# Patient Record
Sex: MALE | Race: WHITE | HISPANIC OR LATINO | Employment: UNEMPLOYED | URBAN - METROPOLITAN AREA
[De-identification: names, ages, dates, MRNs, and addresses within clinical notes are randomized per-mention and may not be internally consistent; named-entity substitution may affect disease eponyms.]

---

## 2020-06-15 ENCOUNTER — HOSPITAL ENCOUNTER (EMERGENCY)
Facility: MEDICAL CENTER | Age: 35
End: 2020-06-15
Attending: EMERGENCY MEDICINE
Payer: COMMERCIAL

## 2020-06-15 ENCOUNTER — APPOINTMENT (OUTPATIENT)
Dept: RADIOLOGY | Facility: MEDICAL CENTER | Age: 35
End: 2020-06-15
Attending: EMERGENCY MEDICINE
Payer: COMMERCIAL

## 2020-06-15 VITALS
DIASTOLIC BLOOD PRESSURE: 70 MMHG | WEIGHT: 155 LBS | HEIGHT: 72 IN | SYSTOLIC BLOOD PRESSURE: 105 MMHG | BODY MASS INDEX: 20.99 KG/M2 | RESPIRATION RATE: 19 BRPM | OXYGEN SATURATION: 96 % | TEMPERATURE: 98.2 F | HEART RATE: 84 BPM

## 2020-06-15 DIAGNOSIS — Z59.00 HOMELESSNESS: ICD-10-CM

## 2020-06-15 DIAGNOSIS — R07.89 ATYPICAL CHEST PAIN: ICD-10-CM

## 2020-06-15 DIAGNOSIS — F41.0 ANXIETY ATTACK: ICD-10-CM

## 2020-06-15 LAB — EKG IMPRESSION: NORMAL

## 2020-06-15 PROCEDURE — 700111 HCHG RX REV CODE 636 W/ 250 OVERRIDE (IP): Performed by: EMERGENCY MEDICINE

## 2020-06-15 PROCEDURE — 93005 ELECTROCARDIOGRAM TRACING: CPT | Performed by: EMERGENCY MEDICINE

## 2020-06-15 PROCEDURE — 99284 EMERGENCY DEPT VISIT MOD MDM: CPT

## 2020-06-15 PROCEDURE — 96372 THER/PROPH/DIAG INJ SC/IM: CPT

## 2020-06-15 PROCEDURE — 71045 X-RAY EXAM CHEST 1 VIEW: CPT

## 2020-06-15 RX ORDER — OLANZAPINE 5 MG/1
10 TABLET ORAL NIGHTLY
COMMUNITY

## 2020-06-15 RX ORDER — LORAZEPAM 2 MG/ML
2 INJECTION INTRAMUSCULAR ONCE
Status: COMPLETED | OUTPATIENT
Start: 2020-06-15 | End: 2020-06-15

## 2020-06-15 RX ADMIN — LORAZEPAM 2 MG: 2 INJECTION INTRAMUSCULAR; INTRAVENOUS at 08:20

## 2020-06-15 SDOH — HEALTH STABILITY: MENTAL HEALTH: HOW OFTEN DO YOU HAVE A DRINK CONTAINING ALCOHOL?: 2-3 TIMES A WEEK

## 2020-06-15 SDOH — ECONOMIC STABILITY - HOUSING INSECURITY: HOMELESSNESS UNSPECIFIED: Z59.00

## 2020-06-15 NOTE — ED NOTES
Pt uncooperative with EKG at this time. Pt educated on the purpose of exam. Pt still uncooperative.

## 2020-06-15 NOTE — ED NOTES
"RN attempted to d/c pt. Pt states \"I am homeless and don't know where to go\".  called and information packet of [facility] resources obtained for pt.   "

## 2020-06-15 NOTE — ED PROVIDER NOTES
ED Provider Note    Scribed for Andres Pierce M.D. by Kimberly Baird. 6/15/2020  8:12 AM    Primary care provider: None noted.  Means of arrival: EMS  History obtained from: Patient   History limited by: None     CHIEF COMPLAINT  Chief Complaint   Patient presents with   • Chest Pain       HPI  Raymond Joe is a 34 y.o. male, with a history of schizophrenia, who presents to the Emergency Department moderate to severe central chest pain onset prior to arrival. He denies any radiation of pain and notes he fell to the ground secondary to pain. No alleviating or exacerbating factors noted at this time. Per triage note, patient was found laying on a side walk complaining of chest pain. He was reportedly kicked out of the [facility] where he was living. No additional pain or symptoms noted at this time.     REVIEW OF SYSTEMS  Pertinent positives include chest pain.   Pertinent negatives include: No additional pain or symptoms noted at this time. .    All other systems reviewed and negative. See HPI for further details.     PAST MEDICAL HISTORY   has a past medical history of Schizophrenia (HCC).    SURGICAL HISTORY  patient denies any surgical history    SOCIAL HISTORY  Social History     Tobacco Use   • Smoking status: Never Smoker   • Smokeless tobacco: Never Used   Substance Use Topics   • Alcohol use: Yes     Frequency: 2-3 times a week   • Drug use: Not Currently     Types: Inhaled     Comment: meth      Social History     Substance and Sexual Activity   Drug Use Not Currently   • Types: Inhaled    Comment: meth       FAMILY HISTORY  History reviewed. No pertinent family history.    CURRENT MEDICATIONS  Home Medications     Reviewed by Danya Varma R.N. (Registered Nurse) on 06/15/20 at 0752  Med List Status: Not Addressed   Medication Last Dose Status   OLANZapine (ZYPREXA) 5 MG Tab  Active                ALLERGIES  No Known Allergies    PHYSICAL EXAM  VITAL SIGNS: /84   Pulse (!) 104   Temp 37.1 °C  (98.7 °F) (Temporal)   Resp (!) 28   Ht 1.829 m (6')   Wt 70.3 kg (155 lb)   SpO2 99%   BMI 21.02 kg/m²     Nursing note and vitals reviewed.  Constitutional: Well-developed and well-nourished. No distress. Disheveled and tremulous.   HENT: Head is normocephalic and atraumatic. Oropharynx is clear and moist without exudate or erythema.   Eyes: Pupils are equal, round, and reactive to light. Conjunctiva are normal.   Cardiovascular: Normal rate and regular rhythm. No murmur heard. Normal radial pulses.  Pulmonary/Chest: Breath sounds normal. No wheezes or rales. Hyperventilating.   Abdominal: Soft and non-tender. No distention    Musculoskeletal: Extremities exhibit normal range of motion without edema or tenderness.   Neurological: Awake, alert and oriented to person, place, and time. No focal deficits noted.  Skin: Skin is warm and dry. No rash.   Psychiatric: Normal mood and affect. Appropriate for clinical situation. Anxious.     I verified that the patient was wearing a mask and I was wearing appropriate PPE every time I entered the room. The patient's mask was on the patient at all times during my encounter except for a brief view of the oropharynx.     DIAGNOSTIC STUDIES / PROCEDURES    EKG Interpretation  Interpreted by me as below    LABS  Results for orders placed or performed during the hospital encounter of 06/15/20   EKG (NOW)   Result Value Ref Range    Report       [facility] Emergency Dept.    Test Date:  2020-06-15  Pt Name:    CORIN HORVATH                Department: ER  MRN:        [id number]                      Room:        30  Gender:     Male                         Technician: [id number]  :        1985                   Requested By:ER TRIAGE PROTOCOL  Order #:    [id number]                    Reading MD: SETH BARRON MD    Measurements  Intervals                                Axis  Rate:       106                          P:          77  MD:         129                           QRS:        83  QRSD:       94                           T:          69  QT:         332  QTc:        441    Interpretive Statements  SINUS TACHYCARDIA  PROBABLE LEFT ATRIAL ABNORMALITY  RSR' IN V1 OR V2, PROBABLY NORMAL VARIANT  LATERAL INFARCT, OLD  ARTIFACT IN LEAD(S) II,aVF,V1,V2,V3,V6  No previous ECG available for comparison  Electronically Signed On 6- 8:17:21 PDT by SETH BARRON MD        All labs reviewed by me.    RADIOLOGY  DX-CHEST-PORTABLE (1 VIEW)   Final Result      No acute cardiopulmonary abnormality.        The radiologist's interpretation of all radiological studies have been reviewed by me.    COURSE & MEDICAL DECISION MAKING  Nursing notes, VS, PMSFHx reviewed in chart.     No medical records available to review.     8:12 AM - Patient seen and examined at bedside. I informed the patient the need for labs and radiology to rule out any emergent processes. Currently awaiting results before deciding if intervention is necessary. Patient verbalizes understanding and agreement to this plan of care. Patient will be treated with Ativan injection 2 mg. Ordered DX-chest and EKG to evaluate his symptoms. The differential diagnoses include but are not limited to: panic attack, chest wall pain, pneumonia, arrhythmia, vs ACS.    8:57 AM - EKG shows no evidence of ischemia, chest x ray is negative, he is feeling improved after Ativan. He will be discharged home with strict ED precautions. He is agreeable to this plan.       The patient will return for new or worsening symptoms and is stable at the time of discharge.    The patient is referred to a primary physician for blood pressure management, diabetic screening, and for all other preventative health concerns.    DISPOSITION:  Patient will be discharged home in stable condition.    FOLLOW UP:  [facility], Emergency Dept  [address] [address] [phone]        FINAL IMPRESSION  1. Atypical  chest pain    2. Homelessness    3. Anxiety attack          I, Kimberly Baird ([place]), am scribing for, and in the presence of, Andres Pierce M.D..    Electronically signed by: Kimberly Baird ([place]), 6/15/2020    Andres PICKETT M.D. personally performed the services described in this documentation, as scribed by Kimberly Baird in my presence, and it is both accurate and complete. C.    The note accurately reflects work and decisions made by me.  Andres Pierce M.D.  6/15/2020  11:21 AM

## 2020-06-15 NOTE — ED TRIAGE NOTES
Pt BIB EMS, pt found laying on side walk. When pt awoke pt c/o chest pain. Pt states that he was kicked out of [facility] where he was living. Pt states CP started approx 1 hr ago. Pt currently anxious and hyperventilating. Pt uncooperative with direction. Pt A&O x 4.

## 2020-06-15 NOTE — ED NOTES
Pt given d/c instructions and directions to shelters. Pt able to illustrate understanding as evidence by verbal feed-back of information given. Pt is stable for d/c at present time. Pt has no further concerns at present time. Pt able to ambulate. And A&O x4.

## 2021-12-27 ENCOUNTER — HOSPITAL ENCOUNTER (EMERGENCY)
Dept: HOSPITAL 15 - ER | Age: 36
LOS: 1 days | Discharge: HOME | End: 2021-12-28
Payer: MEDICAID

## 2021-12-27 VITALS — HEIGHT: 72 IN | WEIGHT: 177 LBS | BODY MASS INDEX: 23.98 KG/M2

## 2021-12-27 DIAGNOSIS — S09.8XXA: Primary | ICD-10-CM
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
## 2021-12-27 PROCEDURE — 70450 CT HEAD/BRAIN W/O DYE: CPT

## 2021-12-27 PROCEDURE — 70486 CT MAXILLOFACIAL W/O DYE: CPT

## 2021-12-28 ENCOUNTER — HOSPITAL ENCOUNTER (EMERGENCY)
Dept: HOSPITAL 15 - ER | Age: 36
LOS: 1 days | Discharge: HOME | End: 2021-12-29
Payer: MEDICAID

## 2021-12-28 VITALS — BODY MASS INDEX: 25.2 KG/M2 | HEIGHT: 71 IN | WEIGHT: 180 LBS

## 2021-12-28 VITALS — DIASTOLIC BLOOD PRESSURE: 45 MMHG | SYSTOLIC BLOOD PRESSURE: 153 MMHG

## 2021-12-28 DIAGNOSIS — S90.32XA: Primary | ICD-10-CM
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
## 2021-12-28 DIAGNOSIS — F20.9: ICD-10-CM
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
## 2021-12-28 DIAGNOSIS — Z20.822: ICD-10-CM

## 2021-12-28 PROCEDURE — 80307 DRUG TEST PRSMV CHEM ANLYZR: CPT

## 2021-12-28 PROCEDURE — 36415 COLL VENOUS BLD VENIPUNCTURE: CPT

## 2021-12-28 PROCEDURE — 87426 SARSCOV CORONAVIRUS AG IA: CPT

## 2021-12-28 PROCEDURE — 73630 X-RAY EXAM OF FOOT: CPT

## 2021-12-29 VITALS — SYSTOLIC BLOOD PRESSURE: 128 MMHG | DIASTOLIC BLOOD PRESSURE: 84 MMHG

## 2021-12-29 LAB
ALCOHOL, URINE: < 3 MG/DL (ref 0–10)
AMPHETAMINES UR QL SCN: NEGATIVE
BARBITURATES UR QL SCN: NEGATIVE
BENZODIAZ UR QL SCN: NEGATIVE
BZE UR QL SCN: NEGATIVE
CANNABINOIDS UR QL SCN: NEGATIVE
OPIATES UR QL SCN: NEGATIVE
PCP UR QL SCN: NEGATIVE